# Patient Record
Sex: FEMALE | Race: WHITE | Employment: UNEMPLOYED | URBAN - METROPOLITAN AREA
[De-identification: names, ages, dates, MRNs, and addresses within clinical notes are randomized per-mention and may not be internally consistent; named-entity substitution may affect disease eponyms.]

---

## 2017-02-06 ENCOUNTER — TELEPHONE (OUTPATIENT)
Dept: INTERNAL MEDICINE CLINIC | Age: 47
End: 2017-02-06

## 2017-02-06 NOTE — TELEPHONE ENCOUNTER
Pt calling, says she and her  told by call center that they are no longer patients because they haven't been seen since 2013. Wants to know if RD will take them both back as patients.

## 2017-06-13 ENCOUNTER — TELEPHONE (OUTPATIENT)
Dept: INTERNAL MEDICINE CLINIC | Age: 47
End: 2017-06-13

## 2017-06-13 DIAGNOSIS — M25.561 RIGHT KNEE PAIN, UNSPECIFIED CHRONICITY: Primary | ICD-10-CM

## 2017-06-13 NOTE — TELEPHONE ENCOUNTER
Micaela Montano at [place] [address] is requesting referral.    Dr. Rickey Baker  DOS:  6/14/17  Dx:  H35.756  NPI:  [id number]    Fax:  [phone]

## 2017-06-14 ENCOUNTER — OFFICE VISIT (OUTPATIENT)
Dept: ORTHOPEDIC SURGERY | Age: 47
End: 2017-06-14

## 2017-06-14 VITALS
HEART RATE: 83 BPM | DIASTOLIC BLOOD PRESSURE: 80 MMHG | HEIGHT: 63 IN | BODY MASS INDEX: 30.83 KG/M2 | WEIGHT: 174 LBS | SYSTOLIC BLOOD PRESSURE: 108 MMHG | TEMPERATURE: 98.3 F

## 2017-06-14 DIAGNOSIS — M25.561 RIGHT KNEE PAIN, UNSPECIFIED CHRONICITY: ICD-10-CM

## 2017-06-14 DIAGNOSIS — S83.241A ACUTE MEDIAL MENISCUS TEAR, RIGHT, INITIAL ENCOUNTER: Primary | ICD-10-CM

## 2017-06-14 RX ORDER — GLUCOSAMINE SULFATE 1500 MG
POWDER IN PACKET (EA) ORAL DAILY
COMMUNITY

## 2017-06-14 RX ORDER — FEXOFENADINE HCL AND PSEUDOEPHEDRINE HCI 180; 240 MG/1; MG/1
1 TABLET, EXTENDED RELEASE ORAL DAILY
COMMUNITY

## 2017-06-14 RX ORDER — MELOXICAM 15 MG/1
15 TABLET ORAL
Qty: 30 TAB | Refills: 1 | Status: SHIPPED | OUTPATIENT
Start: 2017-06-14

## 2017-06-14 NOTE — TELEPHONE ENCOUNTER
Message left for patient that according to her insurance company patient called and switched to [facility], [address] they did not tell me what doctor but that I could not do a referral from this office patient must switch back to our office or call that office to have referral done

## 2017-06-14 NOTE — PROGRESS NOTES
Melanie Frances  1970   Chief Complaint   Patient presents with    Knee Pain     Right, hurting for 4 weeks, no injury        HISTORY OF PRESENT ILLNESS  Melanie Frances is a 49 y.o. female who presents today for evaluation of right knee pain. She rates her pain 1/10 today. Her pain has been present for about 4 weeks. She denies any injury,trauma, or previous knee issues. She recalls walking around [place] several weeks ago and attributes her pain to a lot of walking. She has tried to ice and rest the right knee. She describes a constant \"charley horse\" in the posterior aspect of the right knee. She states she has been experiencing swelling toward the end of the day.     Allergies   Allergen Reactions    Lamisil [Terbinafine] Palpitations       Patient denies allergy          Past Medical History:   Diagnosis Date    Allergic rhinitis     Back pain     Cervical spine disease 8/12    C5-6 disc disease Dr Gwen Sanchez Dyslipidemia 7/18/2013    HPV (human papilloma virus) infection     Dr. Brooklyn Rivera    Left knee pain     OCD (obsessive compulsive disorder) 1990s    Onychomycosis     Subacromial bursitis 4/11    Dr. 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 Mushtaq Walton, Dr. Willem Lam      Family History   Problem Relation Age of Onset    Hypertension Father     Elevated Lipids Mother     Heart Disease Mother     Arthritis-osteo Other       Current Outpatient Prescriptions   Medication Sig    fexofenadine-pseudoephedrine (ALLEGRA-D 24 HOUR) 180-240 mg per tablet Take 1 Tab by mouth daily.  cholecalciferol (VITAMIN D3) 1,000 unit cap Take  by mouth daily.  meloxicam (MOBIC) 15 mg tablet Take 1 Tab by mouth daily (with breakfast).  FEXOFENADINE HCL (ALLEGRA PO) Take  by mouth.  gabapentin (NEURONTIN) 300 mg capsule Take 1 Cap by mouth two (2) times a day. No current facility-administered medications for this visit. REVIEW OF SYSTEM   Patient denies: Weight loss, Fever/Chills, HA, Visual changes, Fatigue, Chest pain, SOB, Abdominal pain, N/V/D/C, Blood in stool or urine, Edema. Pertinent positive as above in HPI. All others were negative    PHYSICAL EXAM:   Visit Vitals    /80    Pulse 83    Temp 98.3 °F (36.8 °C) (Oral)    Ht 5' 3\" (1.6 m)    Wt 174 lb (78.9 kg)    LMP 06/18/2013    BMI 30.82 kg/m2     The patient is a well-developed, well-nourished female   in no acute distress. The patient is alert and oriented times three. The patient is alert and oriented times three. Mood and affect are normal.  LYMPHATIC: lymph nodes are not enlarged and are within normal limits  SKIN: normal in color and non tender to palpation. There are no bruises or abrasions noted. NEUROLOGICAL: Motor sensory exam is within normal limits. Reflexes are equal bilaterally.  There is normal sensation to pinprick and light touch  MUSCULOSKELETAL:  Examination Right knee   Skin Intact   Range of motion 0-130   Effusion +   Medial joint line tenderness +   Lateral joint line tenderness -   Tenderness Pes Bursa -   Tenderness insertion MCL -   Tenderness insertion LCL -   Gaviotas +   Patella crepitus -   Patella grind -   Lachman -   Pivot shift -   Anterior drawer -   Posterior drawer -   Varus stress -   Valgus stress -   Neurovascular Intact   Calf Swelling and Tenderness to Palpation -   Aura's Test -   Hamstring Cord Tightness - IMAGING: XR of the right knee dated 6/14/17 was reviewed and read: Normal      IMPRESSION:      ICD-10-CM ICD-9-CM    1. Acute medial meniscus tear, right, initial encounter S83.241A 836.0    2. Right knee pain, unspecified chronicity M25.561 719.46 AMB POC XRAY, KNEE; 1/2 VIEWS      meloxicam (MOBIC) 15 mg tablet        PLAN: I am concerned that the patient may have a medial mensicus tear. I discussed proceed with an MRI but patient would like to proceed with more conservative options at this time. She will be provided with a prescription for Mobic today. She may follow up in 3 weeks for a cortisone injection if her pain continues. Follow-up Disposition: Not on File    Scribed by Yessi Zhang [address]) as dictated by Bethany Ramos MD    I, Dr. Bethany Ramos, confirm that all documentation is accurate.     Bethany Ramos M.D.   [facility]

## 2017-06-14 NOTE — TELEPHONE ENCOUNTER
Martita Weiss from [facility] called stating they received incorrect referral from us.     Pt is not our pt and I have informed the office

## 2017-07-27 ENCOUNTER — OFFICE VISIT (OUTPATIENT)
Dept: ORTHOPEDIC SURGERY | Age: 47
End: 2017-07-27

## 2017-07-27 VITALS
HEIGHT: 63 IN | DIASTOLIC BLOOD PRESSURE: 96 MMHG | BODY MASS INDEX: 30.83 KG/M2 | OXYGEN SATURATION: 99 % | SYSTOLIC BLOOD PRESSURE: 148 MMHG | TEMPERATURE: 97.2 F | HEART RATE: 99 BPM | RESPIRATION RATE: 20 BRPM | WEIGHT: 174 LBS

## 2017-07-27 DIAGNOSIS — S83.241A ACUTE MEDIAL MENISCUS TEAR, RIGHT, INITIAL ENCOUNTER: Primary | ICD-10-CM

## 2017-07-27 DIAGNOSIS — M25.561 RIGHT KNEE PAIN, UNSPECIFIED CHRONICITY: ICD-10-CM

## 2017-07-27 RX ORDER — TRIAMCINOLONE ACETONIDE 40 MG/ML
40 INJECTION, SUSPENSION INTRA-ARTICULAR; INTRAMUSCULAR ONCE
Qty: 1 ML | Refills: 0
Start: 2017-07-27 | End: 2017-07-27

## 2017-07-27 NOTE — PATIENT INSTRUCTIONS
Joint Pain: Care Instructions  Your Care Instructions  Many people have small aches and pains from overuse or injury to muscles and joints. Joint injuries often happen during sports or recreation, work tasks, or projects around the home. An overuse injury can happen when you put too much stress on a joint or when you do an activity that stresses the joint over and over, such as using the computer or rowing a boat. You can take action at home to help your muscles and joints get better. You should feel better in 1 to 2 weeks, but it can take 3 months or more to heal completely. Follow-up care is a key part of your treatment and safety. Be sure to make and go to all appointments, and call your doctor if you are having problems. It's also a good idea to know your test results and keep a list of the medicines you take. How can you care for yourself at home? · Do not put weight on the injured joint for at least a day or two. · For the first day or two after an injury, do not take hot showers or baths, and do not use hot packs. The heat could make swelling worse. · Put ice or a cold pack on the sore joint for 10 to 20 minutes at a time. Try to do this every 1 to 2 hours for the next 3 days (when you are awake) or until the swelling goes down. Put a thin cloth between the ice and your skin. · Wrap the injury in an elastic bandage. Do not wrap it too tightly because this can cause more swelling. · Prop up the sore joint on a pillow when you ice it or anytime you sit or lie down during the next 3 days. Try to keep it above the level of your heart. This will help reduce swelling. · Take an over-the-counter pain medicine, such as acetaminophen (Tylenol), ibuprofen (Advil, Motrin), or naproxen (Aleve). Read and follow all instructions on the label. · After 1 or 2 days of rest, begin moving the joint gently.  While the joint is still healing, you can begin to exercise using activities that do not strain or hurt the painful joint. When should you call for help? Call your doctor now or seek immediate medical care if:  · You have signs of infection, such as:  ¨ Increased pain, swelling, warmth, and redness. ¨ Red streaks leading from the joint. ¨ A fever. Watch closely for changes in your health, and be sure to contact your doctor if:  · Your movement or symptoms are not getting better after 1 to 2 weeks of home treatment. Where can you learn more? Go to http://jose-blanca.info/. Enter P205 in the search box to learn more about \"Joint Pain: Care Instructions. \"  Current as of: March 21, 2017  Content Version: 11.3  © 8959-3726 [employer]. Care instructions adapted under license by [employer] (which disclaims liability or warranty for this information). If you have questions about a medical condition or this instruction, always ask your healthcare professional. [address] any warranty or liability for your use of this information.

## 2017-07-27 NOTE — PROGRESS NOTES
Charito Frances  1970   Chief Complaint   Patient presents with    Knee Pain     right        HISTORY OF PRESENT ILLNESS  Charito Frances is a 49 y.o. female who presents today for reevaluation of right knee pain. She rates her pain 0/10 today. Her pain has been present for about 2 months without injury. Patient was doing a lot of walking in [address] [place] [address] Road when the pain first began. At last office visit, patient was provided with a prescription for Mobic as she did not want to proceed with an MRI at that time. She reports to the office today complaining of continued right knee pain and requesting a cortisone injection in the knee. Patient denies any fever, chills, chest pain, shortness of breath or calf pain. There are no new illness or injuries to report since last seen in the office. There are no changes to medications, allergies, family or social history. PHYSICAL EXAM:   Visit Vitals    BP (!) 148/96    Pulse 99    Temp 97.2 °F (36.2 °C) (Oral)    Resp 20    Ht 5' 3\" (1.6 m)    Wt 174 lb (78.9 kg)    LMP 06/18/2013    SpO2 99%    BMI 30.82 kg/m2     The patient is a well-developed, well-nourished female   in no acute distress. The patient is alert and oriented times three. The patient is alert and oriented times three. Mood and affect are normal.  LYMPHATIC: lymph nodes are not enlarged and are within normal limits  SKIN: normal in color and non tender to palpation. There are no bruises or abrasions noted. NEUROLOGICAL: Motor sensory exam is within normal limits. Reflexes are equal bilaterally.  There is normal sensation to pinprick and light touch  MUSCULOSKELETAL:  Examination Right knee   Skin Intact   Range of motion 0-130   Effusion +   Medial joint line tenderness +   Lateral joint line tenderness -   Tenderness Pes Bursa -   Tenderness insertion MCL -   Tenderness insertion LCL -   Gaviotas +   Patella crepitus -   Patella grind -   Lachman -   Pivot shift -   Anterior drawer - Posterior drawer -   Varus stress -   Valgus stress -   Neurovascular Intact   Calf Swelling and Tenderness to Palpation -   Aura's Test -   Hamstring Cord Tightness -       PROCEDURE: After sterile prep, 4 cc of Xylocaine and 1 cc of Kenalog were injected into the right knee. [facility]  OFFICE PROCEDURE PROGRESS NOTE        Chart reviewed for the following:  Lucy Saeed MD, have reviewed the History, Physical and updated the Allergic reactions for [address] [facility] [address] performed immediately prior to start of procedure:  Lucy Saeed MD, have performed the following reviews on [address] [facility] [address] prior to the start of the procedure:            * Patient was identified by name and date of birth   * Agreement on procedure being performed was verified  * Risks and Benefits explained to the patient  * Procedure site verified and marked as necessary  * Patient was positioned for comfort  * Consent was signed and verified     Time: 8:16 AM    Date of procedure: 7/27/2017    Procedure performed by:  Mary Oreilly MD    Provider assisted by: (see medication administration)    How tolerated by patient: tolerated the procedure well with no complications    Comments: none      IMAGING:   XR of the right knee dated 6/14/17 was reviewed and read: Normal    IMPRESSION:      ICD-10-CM ICD-9-CM    1. Acute medial meniscus tear, right, initial encounter S83.241A 836.0 TRIAMCINOLONE ACETONIDE INJ      triamcinolone acetonide (KENALOG) 40 mg/mL injection      DRAIN/INJECT LARGE JOINT/BURSA   2. Right knee pain, unspecified chronicity M25.561 719.46         PLAN:   1. Patient experiencing continued right knee pain despite Mobic. Risk factors include: none  2. Yes cortisone injection indicated today: RIGHT KNEE  3. No Physical/Occupational Therapy indicated today  4. No diagnostic test indicated today  5.  No durable medical equipment indicated today  6. No referral indicated today   7. No medications indicated today  8. No Narcotic indicated today. RTC - 3 weeks if pain continues  Follow-up Disposition: Not on File    Scribed by Garret Erickson [facility]) as dictated by MD JACKSON Courtney, Dr. Ryan Ruffin, confirm that all documentation is accurate.     Ryan Ruffin M.D.   [facility]

## 2018-02-24 NOTE — PATIENT INSTRUCTIONS
Dr. Ira Callahan at bedside discussing BP and test results.       Milderd Babinski, RN  02/23/18 0570 Joint Pain: Care Instructions  Your Care Instructions  Many people have small aches and pains from overuse or injury to muscles and joints. Joint injuries often happen during sports or recreation, work tasks, or projects around the home. An overuse injury can happen when you put too much stress on a joint or when you do an activity that stresses the joint over and over, such as using the computer or rowing a boat. You can take action at home to help your muscles and joints get better. You should feel better in 1 to 2 weeks, but it can take 3 months or more to heal completely. Follow-up care is a key part of your treatment and safety. Be sure to make and go to all appointments, and call your doctor if you are having problems. It's also a good idea to know your test results and keep a list of the medicines you take. How can you care for yourself at home? · Do not put weight on the injured joint for at least a day or two. · For the first day or two after an injury, do not take hot showers or baths, and do not use hot packs. The heat could make swelling worse. · Put ice or a cold pack on the sore joint for 10 to 20 minutes at a time. Try to do this every 1 to 2 hours for the next 3 days (when you are awake) or until the swelling goes down. Put a thin cloth between the ice and your skin. · Wrap the injury in an elastic bandage. Do not wrap it too tightly because this can cause more swelling. · Prop up the sore joint on a pillow when you ice it or anytime you sit or lie down during the next 3 days. Try to keep it above the level of your heart. This will help reduce swelling. · Take an over-the-counter pain medicine, such as acetaminophen (Tylenol), ibuprofen (Advil, Motrin), or naproxen (Aleve). Read and follow all instructions on the label. · After 1 or 2 days of rest, begin moving the joint gently.  While the joint is still healing, you can begin to exercise using activities that do not strain or hurt the painful joint. When should you call for help? Call your doctor now or seek immediate medical care if:  · You have signs of infection, such as:  ¨ Increased pain, swelling, warmth, and redness. ¨ Red streaks leading from the joint. ¨ A fever. Watch closely for changes in your health, and be sure to contact your doctor if:  · Your movement or symptoms are not getting better after 1 to 2 weeks of home treatment. Where can you learn more? Go to http://jose-blanca.info/. Enter P205 in the search box to learn more about \"Joint Pain: Care Instructions. \"  Current as of: May 23, 2016  Content Version: 11.2  © 3265-6206 [employer]. Care instructions adapted under license by [employer] (which disclaims liability or warranty for this information). If you have questions about a medical condition or this instruction, always ask your healthcare professional. [address] any warranty or liability for your use of this information.

## 2022-05-20 ENCOUNTER — OFFICE VISIT (OUTPATIENT)
Dept: ORTHOPEDIC SURGERY | Age: 52
End: 2022-05-20
Payer: COMMERCIAL

## 2022-05-20 VITALS
WEIGHT: 146.4 LBS | TEMPERATURE: 97.7 F | HEART RATE: 92 BPM | BODY MASS INDEX: 25 KG/M2 | HEIGHT: 64 IN | OXYGEN SATURATION: 100 %

## 2022-05-20 DIAGNOSIS — M77.12 LATERAL EPICONDYLITIS OF LEFT ELBOW: Primary | ICD-10-CM

## 2022-05-20 DIAGNOSIS — M25.522 LEFT ELBOW PAIN: ICD-10-CM

## 2022-05-20 PROCEDURE — 20550 NJX 1 TENDON SHEATH/LIGAMENT: CPT | Performed by: SPECIALIST

## 2022-05-20 PROCEDURE — 99203 OFFICE O/P NEW LOW 30 MIN: CPT | Performed by: SPECIALIST

## 2022-05-20 RX ORDER — BETAMETHASONE SODIUM PHOSPHATE AND BETAMETHASONE ACETATE 3; 3 MG/ML; MG/ML
3 INJECTION, SUSPENSION INTRA-ARTICULAR; INTRALESIONAL; INTRAMUSCULAR; SOFT TISSUE ONCE
Status: COMPLETED | OUTPATIENT
Start: 2022-05-20 | End: 2022-05-20

## 2022-05-20 RX ADMIN — BETAMETHASONE SODIUM PHOSPHATE AND BETAMETHASONE ACETATE 3 MG: 3; 3 INJECTION, SUSPENSION INTRA-ARTICULAR; INTRALESIONAL; INTRAMUSCULAR; SOFT TISSUE at 10:41

## 2022-05-20 NOTE — PATIENT INSTRUCTIONS
Tennis Elbow: Exercises  Introduction  Here are some examples of exercises for you to try. The exercises may be suggested for a condition or for rehabilitation. Start each exercise slowly. Ease off the exercises if you start to have pain. You will be told when to start these exercises and which ones will work best for you. How to do the exercises  Wrist flexor stretch    1. Extend your arm in front of you with your palm up. 2. Bend your wrist, pointing your hand toward the floor. 3. With your other hand, gently bend your wrist farther until you feel a mild to moderate stretch in your forearm. 4. Hold for at least 15 to 30 seconds. Repeat 2 to 4 times. Wrist extensor stretch    1. Repeat steps 1 to 4 of the stretch above but begin with your extended hand palm down. Ball or sock squeeze    1. Hold a tennis ball (or a rolled-up sock) in your hand. 2. Make a fist around the ball (or sock) and squeeze. 3. Hold for about 6 seconds, and then relax for up to 10 seconds. 4. Repeat 8 to 12 times. 5. Switch the ball (or sock) to your other hand and do 8 to 12 times. Wrist deviation    1. Sit so that your arm is supported but your hand hangs off the edge of a flat surface, such as a table. 2. Hold your hand out like you are shaking hands with someone. 3. Move your hand up and down. 4. Repeat this motion 8 to 12 times. 5. Switch arms. 6. Try to do this exercise twice with each hand. Wrist curls    1. Place your forearm on a table with your hand hanging over the edge of the table, palm up. 2. Place a 1- to 2-pound weight in your hand. This may be a dumbbell, a can of food, or a filled water bottle. 3. Slowly raise and lower the weight while keeping your forearm on the table and your palm facing up. 4. Repeat this motion 8 to 12 times. 5. Switch arms, and do steps 1 through 4.  6. Repeat with your hand facing down toward the floor. Switch arms. Biceps curls    1.  Sit leaning forward with your legs slightly spread and your left hand on your left thigh. 2. Place your right elbow on your right thigh, and hold the weight with your forearm horizontal.  3. Slowly curl the weight up and toward your chest.  4. Repeat this motion 8 to 12 times. 5. Switch arms, and do steps 1 through 4. Follow-up care is a key part of your treatment and safety. Be sure to make and go to all appointments, and call your doctor if you are having problems. It's also a good idea to know your test results and keep a list of the medicines you take. Where can you learn more? Go to http://www.gray.com/  Enter E495 in the search box to learn more about \"Tennis Elbow: Exercises. \"  Current as of: July 1, 2021               Content Version: 13.2  © 0663-3754 [employer]. Care instructions adapted under license by [employer] (which disclaims liability or warranty for this information). If you have questions about a medical condition or this instruction, always ask your healthcare professional. [address] any warranty or liability for your use of this information.

## 2024-06-24 ENCOUNTER — OFFICE VISIT (OUTPATIENT)
Age: 54
End: 2024-06-24
Payer: COMMERCIAL

## 2024-06-24 ENCOUNTER — TELEPHONE (OUTPATIENT)
Age: 54
End: 2024-06-24

## 2024-06-24 VITALS — WEIGHT: 159 LBS | HEIGHT: 63 IN | BODY MASS INDEX: 28.17 KG/M2

## 2024-06-24 DIAGNOSIS — M76.821 POSTERIOR TIBIAL TENDINITIS OF RIGHT LOWER EXTREMITY: ICD-10-CM

## 2024-06-24 DIAGNOSIS — M72.2 PLANTAR FASCIITIS OF RIGHT FOOT: ICD-10-CM

## 2024-06-24 DIAGNOSIS — M25.571 RIGHT ANKLE PAIN, UNSPECIFIED CHRONICITY: ICD-10-CM

## 2024-06-24 DIAGNOSIS — M79.671 RIGHT FOOT PAIN: Primary | ICD-10-CM

## 2024-06-24 PROCEDURE — 99204 OFFICE O/P NEW MOD 45 MIN: CPT | Performed by: ORTHOPAEDIC SURGERY

## 2024-06-24 PROCEDURE — 73630 X-RAY EXAM OF FOOT: CPT | Performed by: ORTHOPAEDIC SURGERY

## 2024-06-24 PROCEDURE — 73600 X-RAY EXAM OF ANKLE: CPT | Performed by: ORTHOPAEDIC SURGERY

## 2024-06-24 RX ORDER — ACETAMINOPHEN 500 MG
500 TABLET ORAL 2 TIMES DAILY PRN
Qty: 60 TABLET | Refills: 0 | Status: SHIPPED | OUTPATIENT
Start: 2024-06-24

## 2024-06-24 RX ORDER — MELOXICAM 15 MG/1
15 TABLET ORAL DAILY
Qty: 30 TABLET | Refills: 1 | Status: SHIPPED | OUTPATIENT
Start: 2024-06-24

## 2024-06-24 NOTE — PROGRESS NOTES
AMBULATORY PROGRESS NOTE      Patient: Janee Pandey             MRN: [id number]     SSN: [id number] Body mass index is 28.17 kg/m².  YOB: 1970     AGE: 54 y.o.       EX: female    PCP: Jaycee Wheat MD       IMPRESSION //  DIAGNOSIS AND TREATMENT PLAN      Janee Pandey has a diagnosis of:      DIAGNOSES    1. Right foot pain    2. Right ankle pain, unspecified chronicity    3. Plantar fasciitis of right foot    4. Posterior tibial tendinitis of right lower extremity          PLAN:    1. Prescribed Meloxicam 15 mg QD  2.  Activity modification: Stop running in place, crosstraining: Swim, bike, spin, then gradually increase and resume her activities, over the course of the next 3 weeks or so.  3.  I reviewed Thera-Band resisted inversion exercises with her and she understands how to perform these    RTO 6 weeks    Orders Placed This Encounter    [23313] Ankle 2V     Order Specific Question:   Are you Pregnant?     Answer:   No    [62613] Foot Min 3V     Order Specific Question:   Are you Pregnant?     Answer:   No    meloxicam (MOBIC) 15 MG tablet     Sig: Take 1 tablet by mouth daily     Dispense:  30 tablet     Refill:  1    acetaminophen (TYLENOL) 500 MG tablet     Sig: Take 1 tablet by mouth 2 times daily as needed for Pain     Dispense:  60 tablet     Refill:  0        Patient Instructions   6 weeks for follow up      Please follow up with your PCP for any health maintenance as recommended.         Janee Pandey  expresses understanding of the diagnosis, treatment plan, and all of their proposed questions were answered to their satisfaction. Patient education has been provided re the diagnoses.         HPI //  OBJECTIVE EXAMINATION      Janee Pandey IS A 54 y.o. female who is a/an  new patient, presenting to my outpatient office for evaluation of  the following chief complaint(s):     Chief Complaint   Patient presents with    Foot Pain     Right foot pain

## 2024-06-24 NOTE — TELEPHONE ENCOUNTER
Patient called to follow up on the status of the Meloxicam 15 mg QD discussed at today's visit. States she spoke to her pharmacy and they have not received it yet. She would like it sent to [place] [address] [place] [address] - P [phone] - F [phone].

## 2024-07-29 ENCOUNTER — TELEPHONE (OUTPATIENT)
Age: 54
End: 2024-07-29

## 2024-07-29 NOTE — TELEPHONE ENCOUNTER
Patient was advised to call us back to advise how medication worked.  She has completed our prescription, however, once she finished it the pain returned.  She explored the option of shoe inserts but the expense was too much.  They advised her that in some instances if her doctor writes the prescription correctly insurance will help with the cost.  Please review to determine what we can do to help patient and advise her back at [phone].

## 2024-07-29 NOTE — PROGRESS NOTES
I spoke with her.  She called the [facility], was able to try on 1 insert.  She felt that it helped her foot feel quite good.  This is a three-quarter length orthotic, cost about $349 hour.  The custom ones, from [facility], across about $549.  Because of the cost of this current time, she decided to order a three-quarter length firm arch support, from [employer].  She will try this first to see how she does.  If it does not help her, she will call me let me know that is not working for her.  And she will more than likely get the one from [facility].    Other options would be to obtain a custom trilaminar orthotic medially posted over a very low-profile three-quarter length firm orthotic leather coated/lined  so as to decrease the firmness of the orthotic.    SHARDA Sibley MD  7/29/2024 6:11 PM

## 2024-08-31 DIAGNOSIS — M72.2 PLANTAR FASCIITIS OF RIGHT FOOT: ICD-10-CM

## 2024-08-31 DIAGNOSIS — M25.571 RIGHT ANKLE PAIN, UNSPECIFIED CHRONICITY: ICD-10-CM

## 2024-09-03 RX ORDER — MELOXICAM 15 MG/1
15 TABLET ORAL DAILY
Qty: 30 TABLET | Refills: 1 | Status: SHIPPED | OUTPATIENT
Start: 2024-09-03

## 2025-01-04 DIAGNOSIS — M25.571 RIGHT ANKLE PAIN, UNSPECIFIED CHRONICITY: ICD-10-CM

## 2025-01-04 DIAGNOSIS — M72.2 PLANTAR FASCIITIS OF RIGHT FOOT: ICD-10-CM

## 2025-01-06 RX ORDER — MELOXICAM 15 MG/1
15 TABLET ORAL DAILY
Qty: 30 TABLET | Refills: 1 | Status: SHIPPED | OUTPATIENT
Start: 2025-01-06

## 2025-01-20 ENCOUNTER — TRANSCRIBE ORDERS (OUTPATIENT)
Facility: HOSPITAL | Age: 55
End: 2025-01-20

## 2025-01-20 DIAGNOSIS — Z13.6 SCREENING FOR CARDIOVASCULAR CONDITION: ICD-10-CM

## 2025-01-20 DIAGNOSIS — E78.2 MIXED HYPERLIPIDEMIA: Primary | ICD-10-CM

## 2025-03-21 DIAGNOSIS — M72.2 PLANTAR FASCIITIS OF RIGHT FOOT: ICD-10-CM

## 2025-03-21 DIAGNOSIS — M25.571 RIGHT ANKLE PAIN, UNSPECIFIED CHRONICITY: ICD-10-CM

## 2025-03-24 RX ORDER — MELOXICAM 15 MG/1
15 TABLET ORAL DAILY
Qty: 30 TABLET | Refills: 1 | OUTPATIENT
Start: 2025-03-24